# Patient Record
Sex: MALE | Race: WHITE
[De-identification: names, ages, dates, MRNs, and addresses within clinical notes are randomized per-mention and may not be internally consistent; named-entity substitution may affect disease eponyms.]

---

## 2022-03-06 ENCOUNTER — HOSPITAL ENCOUNTER (EMERGENCY)
Age: 57
Discharge: HOME | End: 2022-03-06
Payer: COMMERCIAL

## 2022-03-06 VITALS — BODY MASS INDEX: 31.1 KG/M2

## 2022-03-06 VITALS
RESPIRATION RATE: 16 BRPM | SYSTOLIC BLOOD PRESSURE: 160 MMHG | TEMPERATURE: 97.9 F | DIASTOLIC BLOOD PRESSURE: 92 MMHG | HEART RATE: 54 BPM | OXYGEN SATURATION: 99 %

## 2022-03-06 DIAGNOSIS — Z79.899: ICD-10-CM

## 2022-03-06 DIAGNOSIS — E03.9: ICD-10-CM

## 2022-03-06 DIAGNOSIS — Z79.890: ICD-10-CM

## 2022-03-06 DIAGNOSIS — R20.2: Primary | ICD-10-CM

## 2022-03-06 DIAGNOSIS — Z87.891: ICD-10-CM

## 2022-03-06 DIAGNOSIS — I10: ICD-10-CM

## 2022-03-06 DIAGNOSIS — F41.9: ICD-10-CM

## 2022-03-06 LAB
ANION GAP: 2 (ref 5–15)
BUN SERPL-MCNC: 19 MG/DL (ref 7–18)
BUN/CREAT RATIO: 18.4 RATIO (ref 10–20)
CALCIUM SERPL-MCNC: 8.9 MG/DL (ref 8.5–10.1)
CARBON DIOXIDE: 27 MMOL/L (ref 21–32)
CHLORIDE: 110 MMOL/L (ref 98–107)
DEPRECATED RDW RBC: 39.5 FL (ref 35.1–43.9)
ERYTHROCYTE [DISTWIDTH] IN BLOOD: 12.2 % (ref 11.6–14.6)
EST GLOM FILT RATE - AFR AMER: 95 ML/MIN (ref 60–?)
ESTIMATED CREATININE CLEARANCE: 85.8 ML/MIN
GLUCOSE: 121 MG/DL (ref 74–106)
HCT VFR BLD AUTO: 47.3 % (ref 40–54)
HEMOGLOBIN: 16.5 G/DL (ref 13–16.5)
HGB BLD-MCNC: 16.5 G/DL (ref 13–16.5)
IMMATURE GRANULOCYTES COUNT: 0.02 X10^3/UL (ref 0–0)
MAGNESIUM: 1.9 MG/DL (ref 1.6–2.6)
MCV RBC: 88.2 FL (ref 80–94)
MEAN CORP HGB CONC: 34.9 G/DL (ref 32–36)
MEAN PLATELET VOL.: 11.7 FL (ref 6.2–12)
NRBC FLAGGED BY ANALYZER: 0 % (ref 0–5)
PLATELET # BLD: 153 K/MM3 (ref 150–450)
PLATELET COUNT: 153 K/MM3 (ref 150–450)
POTASSIUM: 4.3 MMOL/L (ref 3.5–5.1)
RBC # BLD AUTO: 5.36 M/MM3 (ref 4.6–6.2)
RBC DISTRIBUTION WIDTH CV: 12.2 % (ref 11.6–14.6)
RBC DISTRIBUTION WIDTH SD: 39.5 FL (ref 35.1–43.9)
WBC # BLD AUTO: 5 K/MM3 (ref 4.4–11)
WHITE BLOOD COUNT: 5 K/MM3 (ref 4.4–11)

## 2022-03-06 PROCEDURE — 99283 EMERGENCY DEPT VISIT LOW MDM: CPT

## 2022-03-06 PROCEDURE — 80048 BASIC METABOLIC PNL TOTAL CA: CPT

## 2022-03-06 PROCEDURE — 85025 COMPLETE CBC W/AUTO DIFF WBC: CPT

## 2022-03-06 PROCEDURE — 83735 ASSAY OF MAGNESIUM: CPT

## 2022-03-06 PROCEDURE — A4216 STERILE WATER/SALINE, 10 ML: HCPCS

## 2022-08-27 ENCOUNTER — HOSPITAL ENCOUNTER (EMERGENCY)
Age: 57
Discharge: HOME | End: 2022-08-27
Payer: COMMERCIAL

## 2022-08-27 VITALS
TEMPERATURE: 97.52 F | HEART RATE: 95 BPM | RESPIRATION RATE: 18 BRPM | SYSTOLIC BLOOD PRESSURE: 141 MMHG | DIASTOLIC BLOOD PRESSURE: 98 MMHG | OXYGEN SATURATION: 99 %

## 2022-08-27 VITALS
TEMPERATURE: 97.52 F | SYSTOLIC BLOOD PRESSURE: 152 MMHG | OXYGEN SATURATION: 97 % | HEART RATE: 79 BPM | DIASTOLIC BLOOD PRESSURE: 95 MMHG | RESPIRATION RATE: 15 BRPM

## 2022-08-27 VITALS
SYSTOLIC BLOOD PRESSURE: 150 MMHG | RESPIRATION RATE: 16 BRPM | DIASTOLIC BLOOD PRESSURE: 91 MMHG | HEART RATE: 72 BPM | OXYGEN SATURATION: 99 %

## 2022-08-27 VITALS
SYSTOLIC BLOOD PRESSURE: 129 MMHG | DIASTOLIC BLOOD PRESSURE: 78 MMHG | RESPIRATION RATE: 13 BRPM | HEART RATE: 75 BPM | OXYGEN SATURATION: 97 %

## 2022-08-27 VITALS — BODY MASS INDEX: 27.5 KG/M2

## 2022-08-27 VITALS
SYSTOLIC BLOOD PRESSURE: 122 MMHG | DIASTOLIC BLOOD PRESSURE: 81 MMHG | RESPIRATION RATE: 16 BRPM | OXYGEN SATURATION: 98 %

## 2022-08-27 VITALS — DIASTOLIC BLOOD PRESSURE: 81 MMHG | SYSTOLIC BLOOD PRESSURE: 131 MMHG

## 2022-08-27 VITALS
RESPIRATION RATE: 16 BRPM | OXYGEN SATURATION: 96 % | HEART RATE: 95 BPM | DIASTOLIC BLOOD PRESSURE: 79 MMHG | SYSTOLIC BLOOD PRESSURE: 143 MMHG

## 2022-08-27 VITALS — BODY MASS INDEX: 2.7 KG/M2

## 2022-08-27 DIAGNOSIS — E06.3: ICD-10-CM

## 2022-08-27 DIAGNOSIS — Q23.1: ICD-10-CM

## 2022-08-27 DIAGNOSIS — R07.9: Primary | ICD-10-CM

## 2022-08-27 DIAGNOSIS — Z79.899: ICD-10-CM

## 2022-08-27 DIAGNOSIS — Z86.79: ICD-10-CM

## 2022-08-27 DIAGNOSIS — M54.9: ICD-10-CM

## 2022-08-27 DIAGNOSIS — Z87.891: ICD-10-CM

## 2022-08-27 DIAGNOSIS — R03.0: ICD-10-CM

## 2022-08-27 DIAGNOSIS — F41.9: ICD-10-CM

## 2022-08-27 DIAGNOSIS — E03.9: ICD-10-CM

## 2022-08-27 LAB
ALANINE AMINOTRANSFER ALT/SGPT: 29 U/L (ref 16–61)
ALBUMIN SERPL-MCNC: 4.1 G/DL (ref 3.2–5)
ALKALINE PHOSPHATASE: 75 U/L (ref 45–117)
ANION GAP: 5 (ref 5–15)
ANION GAP: 7 (ref 5–15)
AST(SGOT): 15 U/L (ref 15–37)
BUN SERPL-MCNC: 19 MG/DL (ref 7–18)
BUN SERPL-MCNC: 20 MG/DL (ref 7–18)
BUN/CREAT RATIO: 18.6 RATIO (ref 10–20)
BUN/CREAT RATIO: 18.9 RATIO (ref 10–20)
CALCIUM SERPL-MCNC: 8.7 MG/DL (ref 8.5–10.1)
CALCIUM SERPL-MCNC: 9.2 MG/DL (ref 8.5–10.1)
CARBON DIOXIDE: 23 MMOL/L (ref 21–32)
CARBON DIOXIDE: 29 MMOL/L (ref 21–32)
CARDIAC TROPONIN I PNL SERPL HS: 6 PG/ML (ref 3–78)
CHLORIDE: 108 MMOL/L (ref 98–107)
CHLORIDE: 110 MMOL/L (ref 98–107)
CPK TOTAL, CREATINE KINASE: 94 U/L (ref 39–308)
DEPRECATED RDW RBC: 38.6 FL (ref 35.1–43.9)
DEPRECATED RDW RBC: 40 FL (ref 35.1–43.9)
DIFFERENTIAL COMMENT: (no result)
DIFFERENTIAL INDICATED: (no result)
ERYTHROCYTE [DISTWIDTH] IN BLOOD: 12 % (ref 11.6–14.6)
ERYTHROCYTE [DISTWIDTH] IN BLOOD: 12.2 % (ref 11.6–14.6)
EST GLOM FILT RATE - AFR AMER: 92 ML/MIN (ref 60–?)
EST GLOM FILT RATE - AFR AMER: 96 ML/MIN (ref 60–?)
ESTIMATED CREATININE CLEARANCE: 10.01 ML/MIN
ESTIMATED CREATININE CLEARANCE: 82.36 ML/MIN
FREE T3: 2 PG/ML (ref 2.18–3.98)
GLOBULIN: 3.2 G/DL (ref 2.2–4.2)
GLUCOSE: 102 MG/DL (ref 74–106)
GLUCOSE: 187 MG/DL (ref 74–106)
HCT VFR BLD AUTO: 45.4 % (ref 40–54)
HCT VFR BLD AUTO: 46.3 % (ref 40–54)
HEMOGLOBIN: 15.4 G/DL (ref 13–16.5)
HEMOGLOBIN: 15.9 G/DL (ref 13–16.5)
HGB BLD-MCNC: 15.4 G/DL (ref 13–16.5)
HGB BLD-MCNC: 15.9 G/DL (ref 13–16.5)
IMMATURE GRANULOCYTES COUNT: 0.01 X10^3/UL (ref 0–0)
IMMATURE GRANULOCYTES COUNT: 0.04 X10^3/UL (ref 0–0)
MAGNESIUM: 2.1 MG/DL (ref 1.6–2.6)
MANUAL DIF COMMENT BLD-IMP: (no result)
MCV RBC: 88 FL (ref 80–94)
MCV RBC: 89.4 FL (ref 80–94)
MEAN CORP HGB CONC: 33.9 G/DL (ref 32–36)
MEAN CORP HGB CONC: 34.3 G/DL (ref 32–36)
MEAN PLATELET VOL.: 11.6 FL (ref 6.2–12)
MEAN PLATELET VOL.: 11.7 FL (ref 6.2–12)
NRBC FLAGGED BY ANALYZER: 0 % (ref 0–5)
NRBC FLAGGED BY ANALYZER: 0 % (ref 0–5)
PLATELET # BLD: 146 K/MM3 (ref 150–450)
PLATELET # BLD: 148 K/MM3 (ref 150–450)
PLATELET COUNT: 146 K/MM3 (ref 150–450)
PLATELET COUNT: 148 K/MM3 (ref 150–450)
POSITIVE DIFFERENTIAL: YES
POTASSIUM: 3.3 MMOL/L (ref 3.5–5.1)
POTASSIUM: 3.8 MMOL/L (ref 3.5–5.1)
PROTHROMBIN TIME (PROTIME)PT.: 13.1 SECONDS (ref 11.7–14.9)
RBC # BLD AUTO: 5.08 M/MM3 (ref 4.6–6.2)
RBC # BLD AUTO: 5.26 M/MM3 (ref 4.6–6.2)
RBC DISTRIBUTION WIDTH CV: 12 % (ref 11.6–14.6)
RBC DISTRIBUTION WIDTH CV: 12.2 % (ref 11.6–14.6)
RBC DISTRIBUTION WIDTH SD: 38.6 FL (ref 35.1–43.9)
RBC DISTRIBUTION WIDTH SD: 40 FL (ref 35.1–43.9)
SCAN SMEAR PER REVIEW CRITERIA: (no result)
TROPONIN-I HS: 4 PG/ML (ref 3–78)
TROPONIN-I HS: < 3 PG/ML (ref 3–78)
WBC # BLD AUTO: 6.6 K/MM3 (ref 4.4–11)
WBC # BLD AUTO: 8.6 K/MM3 (ref 4.4–11)
WHITE BLOOD COUNT: 6.6 K/MM3 (ref 4.4–11)
WHITE BLOOD COUNT: 8.6 K/MM3 (ref 4.4–11)

## 2022-08-27 PROCEDURE — 82550 ASSAY OF CK (CPK): CPT

## 2022-08-27 PROCEDURE — 99283 EMERGENCY DEPT VISIT LOW MDM: CPT

## 2022-08-27 PROCEDURE — 84443 ASSAY THYROID STIM HORMONE: CPT

## 2022-08-27 PROCEDURE — 84481 FREE ASSAY (FT-3): CPT

## 2022-08-27 PROCEDURE — 85610 PROTHROMBIN TIME: CPT

## 2022-08-27 PROCEDURE — 96375 TX/PRO/DX INJ NEW DRUG ADDON: CPT

## 2022-08-27 PROCEDURE — 84439 ASSAY OF FREE THYROXINE: CPT

## 2022-08-27 PROCEDURE — 80053 COMPREHEN METABOLIC PANEL: CPT

## 2022-08-27 PROCEDURE — 99282 EMERGENCY DEPT VISIT SF MDM: CPT

## 2022-08-27 PROCEDURE — 83735 ASSAY OF MAGNESIUM: CPT

## 2022-08-27 PROCEDURE — 71275 CT ANGIOGRAPHY CHEST: CPT

## 2022-08-27 PROCEDURE — 71045 X-RAY EXAM CHEST 1 VIEW: CPT

## 2022-08-27 PROCEDURE — 84100 ASSAY OF PHOSPHORUS: CPT

## 2022-08-27 PROCEDURE — 84484 ASSAY OF TROPONIN QUANT: CPT

## 2022-08-27 PROCEDURE — 85025 COMPLETE CBC W/AUTO DIFF WBC: CPT

## 2022-08-27 PROCEDURE — 93005 ELECTROCARDIOGRAM TRACING: CPT

## 2022-08-27 PROCEDURE — A4216 STERILE WATER/SALINE, 10 ML: HCPCS

## 2022-08-27 PROCEDURE — 80048 BASIC METABOLIC PNL TOTAL CA: CPT

## 2022-08-27 PROCEDURE — 96374 THER/PROPH/DIAG INJ IV PUSH: CPT

## 2023-04-20 ENCOUNTER — HOSPITAL ENCOUNTER (OUTPATIENT)
Dept: HOSPITAL 100 - NS | Age: 58
LOS: 10 days | End: 2023-04-30
Payer: COMMERCIAL

## 2023-04-20 DIAGNOSIS — M79.10: ICD-10-CM

## 2023-04-20 DIAGNOSIS — K90.41: ICD-10-CM

## 2023-04-20 DIAGNOSIS — E73.9: ICD-10-CM

## 2023-04-20 DIAGNOSIS — Z71.3: Primary | ICD-10-CM

## 2023-04-20 DIAGNOSIS — R63.4: ICD-10-CM

## 2023-04-20 DIAGNOSIS — R10.84: ICD-10-CM

## 2023-04-20 DIAGNOSIS — M62.81: ICD-10-CM

## 2023-04-20 DIAGNOSIS — R53.83: ICD-10-CM

## 2023-04-20 PROCEDURE — 97802 MEDICAL NUTRITION INDIV IN: CPT

## 2023-05-11 ENCOUNTER — HOSPITAL ENCOUNTER (OUTPATIENT)
Age: 58
Discharge: HOME | End: 2023-05-11
Payer: COMMERCIAL

## 2023-05-11 DIAGNOSIS — T78.40XA: Primary | ICD-10-CM

## 2023-05-11 PROCEDURE — 86003 ALLG SPEC IGE CRUDE XTRC EA: CPT

## 2023-05-11 PROCEDURE — 36415 COLL VENOUS BLD VENIPUNCTURE: CPT

## 2023-05-19 LAB
BARLEY, WHOLE GRAIN: <0.1 KU/L
BLACK WALNUT IGE QN: <0.1 KU/L
EGG WHITE IGE QN: <0.1 KU/L
EGG YOLK IGE QN: <0.1 KU/L
EGG, WHOLE: <0.1 KU/L
HAZELNUT/FILBERT: <0.1 KU/L
POTATO IGE QN: <0.1 KU/L
TURKEY MEAT IGE QN: <0.1 KU/L

## 2023-06-09 ENCOUNTER — HOSPITAL ENCOUNTER (OUTPATIENT)
Age: 58
Discharge: HOME | End: 2023-06-09
Payer: COMMERCIAL

## 2023-06-09 DIAGNOSIS — R63.4: ICD-10-CM

## 2023-06-09 DIAGNOSIS — K63.3: Primary | ICD-10-CM

## 2023-06-09 LAB
ALANINE AMINOTRANSFER ALT/SGPT: 35 U/L (ref 16–61)
ALBUMIN SERPL-MCNC: 3.7 G/DL (ref 3.2–5)
ALKALINE PHOSPHATASE: 88 U/L (ref 45–117)
ANION GAP: 6 (ref 5–15)
AST(SGOT): 23 U/L (ref 15–37)
BUN SERPL-MCNC: 25 MG/DL (ref 7–18)
BUN/CREAT RATIO: 26.9 RATIO (ref 10–20)
CALCIUM SERPL-MCNC: 8.3 MG/DL (ref 8.5–10.1)
CARBON DIOXIDE: 26 MMOL/L (ref 21–32)
CHLORIDE: 107 MMOL/L (ref 98–107)
CRP SERPL-MCNC: < 2.9 MG/L (ref 0–3)
DEPRECATED RDW RBC: 41.1 FL (ref 35.1–43.9)
ERYTHROCYTE [DISTWIDTH] IN BLOOD: 12.5 % (ref 11.6–14.6)
EST GLOM FILT RATE - AFR AMER: 107 ML/MIN (ref 60–?)
GLOBULIN: 2.9 G/DL (ref 2.2–4.2)
GLUCOSE: 99 MG/DL (ref 74–106)
HCT VFR BLD AUTO: 45.3 % (ref 40–54)
HEMOGLOBIN: 15.6 G/DL (ref 13–16.5)
HGB BLD-MCNC: 15.6 G/DL (ref 13–16.5)
IMMATURE GRANULOCYTES COUNT: 0.03 X10^3/UL (ref 0–0)
LDH: 168 U/L (ref 87–241)
MCV RBC: 91.1 FL (ref 80–94)
MEAN CORP HGB CONC: 34.4 G/DL (ref 32–36)
MEAN PLATELET VOL.: 11.5 FL (ref 6.2–12)
NRBC FLAGGED BY ANALYZER: 0 % (ref 0–5)
PLATELET # BLD: 147 K/MM3 (ref 150–450)
PLATELET COUNT: 147 K/MM3 (ref 150–450)
POTASSIUM: 3.8 MMOL/L (ref 3.5–5.1)
RBC # BLD AUTO: 4.97 M/MM3 (ref 4.6–6.2)
RBC DISTRIBUTION WIDTH CV: 12.5 % (ref 11.6–14.6)
RBC DISTRIBUTION WIDTH SD: 41.1 FL (ref 35.1–43.9)
WBC # BLD AUTO: 4.3 K/MM3 (ref 4.4–11)
WHITE BLOOD COUNT: 4.3 K/MM3 (ref 4.4–11)

## 2023-06-09 PROCEDURE — 82784 ASSAY IGA/IGD/IGG/IGM EACH: CPT

## 2023-06-09 PROCEDURE — 86235 NUCLEAR ANTIGEN ANTIBODY: CPT

## 2023-06-09 PROCEDURE — 85652 RBC SED RATE AUTOMATED: CPT

## 2023-06-09 PROCEDURE — 36415 COLL VENOUS BLD VENIPUNCTURE: CPT

## 2023-06-09 PROCEDURE — 80053 COMPREHEN METABOLIC PANEL: CPT

## 2023-06-09 PROCEDURE — 83615 LACTATE (LD) (LDH) ENZYME: CPT

## 2023-06-09 PROCEDURE — 86140 C-REACTIVE PROTEIN: CPT

## 2023-06-09 PROCEDURE — 85025 COMPLETE CBC W/AUTO DIFF WBC: CPT

## 2023-06-09 PROCEDURE — 82785 ASSAY OF IGE: CPT

## 2023-06-09 PROCEDURE — 84165 PROTEIN E-PHORESIS SERUM: CPT

## 2023-06-09 PROCEDURE — 86334 IMMUNOFIX E-PHORESIS SERUM: CPT

## 2023-06-09 PROCEDURE — 86225 DNA ANTIBODY NATIVE: CPT

## 2023-06-09 PROCEDURE — 86256 FLUORESCENT ANTIBODY TITER: CPT

## 2023-06-09 PROCEDURE — 83516 IMMUNOASSAY NONANTIBODY: CPT

## 2023-06-09 PROCEDURE — 86255 FLUORESCENT ANTIBODY SCREEN: CPT

## 2023-06-12 LAB
ANTI-CHROMATIN: <0.2 AI (ref 0–0.9)
ANTI-DSDNA  AB: <1 IU/ML (ref 0–9)
ANTI-JO: <0.2 AI (ref 0–0.9)
DSDNA AB SER-ACNC: <1 IU/ML (ref 0–9)
ENA JO1 AB SER-ACNC: <0.2 AI (ref 0–0.9)
IGA SER-ACNC: 166 MG/DL (ref 90–386)
IMMUNOGLOBULIN A: 166 MG/DL (ref 90–386)
SJOGREN'S ANTI-SS-A TEST: < 0.2 AI (ref 0–0.9)
SJOGREN'S ANTI-SS-B TEST: < 0.2 AI (ref 0–0.9)

## 2023-06-13 ENCOUNTER — HOSPITAL ENCOUNTER (OUTPATIENT)
Dept: HOSPITAL 100 - LAB | Age: 58
Discharge: HOME | End: 2023-06-13
Payer: COMMERCIAL

## 2023-06-13 DIAGNOSIS — K58.9: ICD-10-CM

## 2023-06-13 DIAGNOSIS — K63.4: ICD-10-CM

## 2023-06-13 DIAGNOSIS — K63.3: Primary | ICD-10-CM

## 2023-06-13 PROCEDURE — 83630 LACTOFERRIN FECAL (QUAL): CPT

## 2023-06-13 PROCEDURE — 83993 ASSAY FOR CALPROTECTIN FECAL: CPT

## 2023-06-15 LAB
ALBUMIN SERPL-SCNC: 4 G/DL (ref 2.9–4.4)
ALBUMIN: 4 G/DL (ref 2.9–4.4)
C-ANCA SER-ACNC: (no result) TITER
GAMMA GLOB SERPL ELPH-MCNC: 0.6 G/DL (ref 0.4–1.8)
GAMMA GLOBULIN: 0.6 G/DL (ref 0.4–1.8)
IGA SERPL-MCNC: 172 MG/DL (ref 90–386)
IGG SERPL-MCNC: 777 MG/DL (ref 603–1613)
IGM SERPL-MCNC: 38 MG/DL (ref 20–172)
IMMUNOGLOBULIN A: 172 MG/DL (ref 90–386)
IMMUNOGLOBULIN G: 777 MG/DL (ref 603–1613)
IMMUNOGLOBULIN M: 38 MG/DL (ref 20–172)
P-ANCA TITR SER IF: (no result) TITER
PROEL- TOTAL PROTEIN: 6.1 G/DL (ref 6–8.5)
PROT SERPL-MCNC: 6.1 G/DL (ref 6–8.5)

## 2023-06-22 LAB — CALPROTECTIN, STOOL: 255 UG/G (ref 0–120)

## 2023-10-27 ENCOUNTER — HOSPITAL ENCOUNTER (OUTPATIENT)
Age: 58
Discharge: HOME | End: 2023-10-27
Payer: COMMERCIAL

## 2023-10-27 DIAGNOSIS — K90.41: Primary | ICD-10-CM

## 2023-10-27 DIAGNOSIS — K63.3: ICD-10-CM

## 2023-10-27 DIAGNOSIS — R63.4: ICD-10-CM

## 2023-10-27 LAB
AMYLASE SERPL-CCNC: 70 U/L (ref 25–115)
CRP SERPL-MCNC: < 2.9 MG/L (ref 0–3)
LIPASE: 68 U/L (ref 13–75)

## 2023-10-27 PROCEDURE — 82705 FATS/LIPIDS FECES QUAL: CPT

## 2023-10-27 PROCEDURE — 82784 ASSAY IGA/IGD/IGG/IGM EACH: CPT

## 2023-10-27 PROCEDURE — 86235 NUCLEAR ANTIGEN ANTIBODY: CPT

## 2023-10-27 PROCEDURE — 86140 C-REACTIVE PROTEIN: CPT

## 2023-10-27 PROCEDURE — 86256 FLUORESCENT ANTIBODY TITER: CPT

## 2023-10-27 PROCEDURE — 83993 ASSAY FOR CALPROTECTIN FECAL: CPT

## 2023-10-27 PROCEDURE — 82653 EL-1 FECAL QUANTITATIVE: CPT

## 2023-10-27 PROCEDURE — 82150 ASSAY OF AMYLASE: CPT

## 2023-10-27 PROCEDURE — 85652 RBC SED RATE AUTOMATED: CPT

## 2023-10-27 PROCEDURE — 83690 ASSAY OF LIPASE: CPT

## 2023-10-27 PROCEDURE — 86255 FLUORESCENT ANTIBODY SCREEN: CPT

## 2023-10-27 PROCEDURE — 84165 PROTEIN E-PHORESIS SERUM: CPT

## 2023-10-27 PROCEDURE — 83630 LACTOFERRIN FECAL (QUAL): CPT

## 2023-10-27 PROCEDURE — 83516 IMMUNOASSAY NONANTIBODY: CPT

## 2023-10-27 PROCEDURE — 36415 COLL VENOUS BLD VENIPUNCTURE: CPT

## 2023-10-27 PROCEDURE — 86334 IMMUNOFIX E-PHORESIS SERUM: CPT

## 2023-10-27 PROCEDURE — 82785 ASSAY OF IGE: CPT

## 2023-10-27 PROCEDURE — 86225 DNA ANTIBODY NATIVE: CPT

## 2023-10-30 LAB
ANTI-CHROMATIN: <0.2 AI (ref 0–0.9)
ANTI-DSDNA  AB: <1 IU/ML (ref 0–9)
ANTI-JO: <0.2 AI (ref 0–0.9)
DSDNA AB SER-ACNC: <1 IU/ML (ref 0–9)
ENA JO1 AB SER-ACNC: <0.2 AI (ref 0–0.9)
SJOGREN'S ANTI-SS-A TEST: < 0.2 AI (ref 0–0.9)
SJOGREN'S ANTI-SS-B TEST: < 0.2 AI (ref 0–0.9)

## 2023-10-31 LAB
ALBUMIN SERPL-SCNC: 4.2 G/DL (ref 2.9–4.4)
ALBUMIN: 4.2 G/DL (ref 2.9–4.4)
C-ANCA SER-ACNC: (no result) TITER
GAMMA GLOB SERPL ELPH-MCNC: 0.7 G/DL (ref 0.4–1.8)
GAMMA GLOBULIN: 0.7 G/DL (ref 0.4–1.8)
IGA SERPL-MCNC: 198 MG/DL (ref 90–386)
IGG SERPL-MCNC: 827 MG/DL (ref 603–1613)
IGM SERPL-MCNC: 61 MG/DL (ref 20–172)
IMMUNOGLOBULIN A: 198 MG/DL (ref 90–386)
IMMUNOGLOBULIN G: 827 MG/DL (ref 603–1613)
IMMUNOGLOBULIN M: 61 MG/DL (ref 20–172)
P-ANCA TITR SER IF: (no result) TITER
PROEL- TOTAL PROTEIN: 6.6 G/DL (ref 6–8.5)
PROT SERPL-MCNC: 6.6 G/DL (ref 6–8.5)

## 2023-11-01 LAB — ELASTASE PANC STL QL: 230 (ref 200–?)

## 2023-11-09 LAB — CALPROTECTIN, STOOL: 30 UG/G (ref 0–120)

## 2024-09-11 ENCOUNTER — HOSPITAL ENCOUNTER (OUTPATIENT)
Age: 59
Discharge: HOME | End: 2024-09-11
Payer: COMMERCIAL

## 2024-09-11 DIAGNOSIS — Z95.2: Primary | ICD-10-CM

## 2024-09-11 LAB
ANION GAP: 8 (ref 5–15)
BUN SERPL-MCNC: 20 MG/DL (ref 7–18)
BUN/CREAT RATIO: 22.2 RATIO (ref 10–20)
CALCIUM SERPL-MCNC: 9.3 MG/DL (ref 8.5–10.1)
CARBON DIOXIDE: 23 MMOL/L (ref 21–32)
CHLORIDE: 107 MMOL/L (ref 98–107)
EST GLOM FILT RATE - AFR AMER: 110 ML/MIN (ref 60–?)
GLUCOSE: 115 MG/DL (ref 74–106)
MAGNESIUM: 2.5 MG/DL (ref 1.6–2.6)
POTASSIUM: 4.1 MMOL/L (ref 3.5–5.1)

## 2024-09-11 PROCEDURE — 36415 COLL VENOUS BLD VENIPUNCTURE: CPT

## 2024-09-11 PROCEDURE — 80048 BASIC METABOLIC PNL TOTAL CA: CPT

## 2024-09-11 PROCEDURE — 83735 ASSAY OF MAGNESIUM: CPT

## 2024-09-23 ENCOUNTER — HOSPITAL ENCOUNTER (OUTPATIENT)
Dept: HOSPITAL 100 - CR | Age: 59
Discharge: HOME | End: 2024-09-23
Payer: COMMERCIAL

## 2024-09-23 VITALS — DIASTOLIC BLOOD PRESSURE: 64 MMHG | SYSTOLIC BLOOD PRESSURE: 110 MMHG

## 2024-09-23 VITALS — OXYGEN SATURATION: 96 % | HEART RATE: 53 BPM | DIASTOLIC BLOOD PRESSURE: 64 MMHG | SYSTOLIC BLOOD PRESSURE: 110 MMHG

## 2024-09-23 VITALS — BODY MASS INDEX: 25.6 KG/M2

## 2024-09-23 DIAGNOSIS — I71.9: Primary | ICD-10-CM

## 2024-09-23 NOTE — CR.HP_ITS
CR - History & Physical    
General    
Arrival date:: 09/23/24    
Arrival time:: 13:05    
Date of Referral:: 09/11/24    
Date of CR Evaluation:: 09/23/24    
Referring Physician: Dr. Castro    
Primary Diagnosis: heart valve replacement    
History of Present Cardiac Event    
Onset Date     
Heart valve replacement or repair:: Yes (9/11/24 onset)    
Medications    
                                Ambulatory Orders    
    
    
    
?Medication ?Instructions ?Recorded    
     
acetaminophen 500 mg tablet 500 mg PO Q6H PRN 09/11/24    
    
(Tylenol Extra Strength)      
     
aspirin 81 mg tablet,delayed 81 mg PO QDAY 09/11/24    
    
release (Adult Aspirin Regimen)      
     
levothyroxine 75 mcg tablet 75 mcg PO QDAY 09/11/24    
     
magnesium oxide 400 mg PO BID 09/11/24    
     
metoprolol tartrate 25 mg tablet 12.5 mg PO BID 09/11/24    
    
    
    
Allergies    
Allergies    
    
gluten Allergy (Severe, Verified 09/11/24 15:07)    
   GI upset    
pork derived (porcine) Allergy (Severe, Verified 09/11/24 15:07)    
   palpitations    
tree nut (tree nuts) Allergy (Severe, Verified 09/11/24 15:07)    
   palpitations    
Iodinated Contrast Media Allergy (Intermediate, Verified 05/03/23 12:55)    
   convulsions     
lactose Adverse Reaction (Severe, Verified 09/11/24 15:07)    
   GI upset    
prednisone Adverse Reaction (Intermediate, Verified 09/11/24 15:07)    
   psychosis    
   if on for long-term     
    
    
Sleep Disorder Evaluation    
Hx of Sleep Apnea: No    
Do you snore loudly (louder than talking or can be heard through closed doors)?:  
No    
Do you often feel tired/ fatigued/ sleepy during daytime?: No    
Has anyone observed you stop breathing during sleep?: No    
History of Hypertension (for STOP score): No    
STOP Results: Negative    
    
Advanced Directives    
Advanced Directives    
Power of : Yes    
Living Will: Yes    
Advance Directives Information Provided: Yes    
Advance Directives on File: No    
DNR Order?:: No    
    
Past Medical History    
Covid-19 Screening    
Physicial Symptoms     
Other Clinical Concerns     
Exposure Risk     
Pertinent Comorbidities     
Has a serious heart condition:: Yes    
Past Medical Illness    
Past Medical History (Updated 09/11/24 @ 15:04 by Marychuy Mercado RN)    
    
Celiac disease K90.0    
Bradycardia R00.1    
Palpitations R00.2    
Gluten intolerance K90.41    
Lactose intolerance E73.9    
Weight loss, unintentional R63.4    
Acute constipation K59.00    
Muscle weakness M62.81    
Abdominal pain R10.9    
Myalgia M79.10    
Fatigue R53.83    
Aortic stenosis I35.0    
Migraine-cluster headache syndrome G44.009    
Bicuspid aortic valve Q23.1    
Aortic root dilation I77.810    
Former tobacco use Z87.891    
Aortic aneurysm I71.9    
   8/27/22 CTPA w/ 4.5 cm ascending thoracic aorta.    
Hashimoto's thyroiditis E06.3    
Hypothyroidism E03.9    
Cardiac murmur R01.1    
Anxiety F41.9    
    
    
Past Surgical History    
Past Surgical History (Updated 09/11/24 @ 15:04 by Marychuy Mercado RN)    
    
S/P AVR (08/29/24) Z95.2    
   BAV replace with 25# Inspiris    
History of medial meniscus repair of right knee Z98.890    
History of appendectomy Z90.49    
    
    
Family History Summary    
Family History (Updated 09/11/24 @ 15:09 by Marychuy Mercado RN)    
Father   Asthma    
   Heart disease    
   Hypertension    
   Thyroid disorder    
   Diabetes    
   Kidney disease    
Sister   Cancer    
        skin    
Uncle   Throat cancer    
Mother   Heart disease    
   Emphysema lung    
    
    
    
Social History    
Smoking History    
Smoking Status: Former smoker    
Years Smoking: 10    
Packs Smoked per Day: 1 (stopped 40 years ago)    
Alcohol Use    
Alcohol Usage: No    
Substance Abuse    
Hx Substance Use: No    
Occupation    
Occupation (List type of work in comments):: Employed    
Hours worked per day:: 8    
Hobbies, Recreation, Social Activities    
Hobbies: Walking    
Recreational Activities: I am able to engage in all my recreational activities    
    
Social Environment    
Status    
Marital Status:     
Current Living Arrangements    
Living Environment:: Family    
Children    
How many children do you have?: 1    
Do any of your children live nearby?: Yes    
Safety    
Do you feel safe in your surroundings?: Yes    
Assistance    
Do you need any assistance at home?: no    
    
Review of Systems    
Review of Systems    
Hints     
Review of Present Symptoms: Reports Shortness of Breath with Exertion, Operative  
Discomfort, Dizziness/Lightheadedness, Fatigue, Appetite - Normal, Appetite -   
Special Diet and Sleep - Normal; Denies Shortness of Breath at Rest, PVD,   
Angina, Wound Healing, Heart Arrhythmia/Irregularities or Sexual Changes    
Pain    
Is Patient Pain Free?: No    
Pain Location: neck and back    
Pain Level: 3/10    
    
Risk Factor Assessment    
Chief Complaint    
Chief Complaint: heart valve replacement     
    
Vital Signs    
Pulse Ox: 96    
Blood Pressure: 110/64    
Pulse    
Pulse Rate: 53    
Pulse Rhythm: Regular    
Hypertension    
Blood Pressure Sitting - Left Arm: 110/64    
Stress    
Stress: Home/Family    
Obesity    
Height: 6 ft    
Weight:: 189 lb    
Weight in Pounds: 189.0 lbs    
Body Mass Index (BMI): 25.6    
Nutritional Referral for Obesity: No    
Physical Inactivity    
Physical Inactivity: Reg Exercise 30 min/day and Physically demanding job    
Risk Stratification    
Risk Guidelines: Moderate Risk: Risk Factor for Smoking, Risk Factor for   
Dyslipidemia, Risk Factor for Diabetes, Risk Factor for Obesity, Risk Factor for  
Hypertension, Risk Factor for Sedentary Lifestyle and Risk Factor for Depression    
For Smoking    
Smoking Risk Guidelines     
For Dyslipidemia    
Dyslipidemia Risk Guidelines     
For Diabetes Mellitus    
Diabetes Risk Guidelines     
For Obesity/Overweight    
Obesity/Overweight Risk Guidelines     
For Hypertension    
Hypertension Risk Guidelines     
For Sedentary Lifestyle    
Sedentary Lifestyle Risk Guidelines     
For Depression    
Depression Risk Guidelines     
Family History    
Family History (Updated 09/11/24 @ 15:09 by Marychuy Mercado RN)    
Father   Asthma    
   Heart disease    
   Hypertension    
   Thyroid disorder    
   Diabetes    
   Kidney disease    
Sister   Cancer    
Uncle   Throat cancer    
Mother   Heart disease    
   Emphysema lung    
    
    
    
Motivation    
Motivation to Participate    
On a scale of 1 to 10, how prepared are you to commit to attending program?: 10    
What do you see as barriers to successfully being able to complete the program?:  
nothing    
What do you see as the benefits of succesfully completing the program? In other   
words, what do you hope to get out of participating in the program?: confidence    
Are there issues you are dealing with that will interfere with completing the   
program?: no    
Do you have a spouse or signficant other, family or friends who will help   
support you to complete the program?: yes

## 2024-09-23 NOTE — CR.ITP_ITS
Diagnosis    
General Information    
Admitting Diagnosis: heart valve replacement     
    
Personal Learning Style:: Audio/Visual    
Barriers to Learning: No Barriers    
Stage of change r/t lifestyle modifications:: Contemplation    
Gave educational material for:: Treating Heart Disease, How The Heart Works,   
What it means to have Heart Disease, How Coronary Artery Disease is Diagnosed,   
Heart Procedures, What Heart Medications Do, Risk Factors & Modifications,   
Living an Active Life, Nutrition, Emotions & Heart Disease, Stress Management &   
Relaxation and Sleep Disorders & Heart Disease    
Education/Goals    
Cardiac Rehabilitation Goals     
Personal Goals: Initial Assessment: Improve management of stress and emotions,   
Improve energy level, Participate in home exercise program, Get back to work, or  
to resume activities faster, Improve muscle strength and endurance and Control   
risk factors (learn risk factor modification)    
Scale for measuring improvement of personal goals     
Diagnosis & Disease Process    
Outcomes/Goals: Pt IDs own risk factors & lifestyle modifications by Session 10,  
Verbalizes symptoms of angina & response by session 3., Pt independently manages  
and Other Additional Outcomes/Goals:    
Plan/Interventions: Assist Pt to ID & engage in lifestyle modification to reduce  
CVD risk, Instruct on individual risk factors, Review symptoms of angina &   
emergency actions, Review secondary diagnosis & identify educational needs. and   
Other see comment    
30 day Reassessments:: Not Met    
Final Reassessments:: Not Met    
Safety    
Referral to Physical Therapy: No    
Referral to [facility] Case Management: No    
Fall Risk Assessed:: Yes    
Assistive Devices:: None    
    
Exercise - Initial Assessment    
Visit    
Date of Eval: 09/23/24 (initial eval )    
Mets: Pre-: >3 METS for 30 minutes by discharge, >5 METS for 30 minutes by   
discharge, >7 METS for 30 minutes by discharge and Unable to meet goal due to:   
(see comment below)    
Physician Prescribed Exercise    
Modalities: Treadmill, Schwinn Airdyne AD-7, SciFit Stepper, SciFit Pro-II   
Ergometer and SciFit Lateral Trainer    
Frequency: 3x/week for 12 weeks [36 sessions]    
Intensity: 60-80% of age predicted maximum heart rate reserve    
Duration: 30 - 45 minutes    
Current METSs:: 3    
Target Heart Rate::     
EKG Type: SB    
Outcomes & Goals    
Goals:: Verbalizes understanding of THR, RPE & goal METS by session 6, Documents  
in home exercise log/reports 30 min aerobic 5  day/wk by DC, Demonstrates   
accurate pulse taking by DC and Other additional outcome/goals: see below    
Intervention & Plan    
Exercise Program Goals: Instruct on personal THR & RPE, Instruct on MET level &   
personal MET goal, Show patient to take own pulse /validate performance until   
accurate, Instruct on home exercise and Other additional plan/int    
Physical Activity Home Exercise    
Physical Activity - Home Exercise: Safe Exercise, Warm-up, Self-monitoring,   
Cool-Down, Home Exercise > 30 min Daily and Sitting Time <3 hours/daily    
Outcomes & Goals    
Outcomes/Goals: Demonstrates correct Warm-up/exercise Cool-Down (S3) if = 2.5   
METs, Verbalizes symptoms of exercise intolerance by Session 3 (S3), Demonstrate  
safe equipment use (S3) & follows exercise prescrition (6) and Other: See below    
Intervention & Plan    
Plan/Intervention: Instruct warm-up & cool-down if exercising at > 2 METs,   
Instruct on symptoms of exercise intolerance & actions to take, Instruct &   
monitor on saf, Assess intial functional capacity & safety risk and Other See   
below    
    
Nutrition - Initial Assessment    
Program Goals    
Nutrition Program Goals     
Patient has diagnosis of Hyperlipidemia (ICD E78)?: No    
Visit    
Date of Eval: 09/23/24 (initial eval )    
Cholesterol/Lipids (Other Core Measures)    
Determine presence & major risk factors that modify LDL goal: Cigarette smoking,  
Hypertension or hypertensive medication, Low HDL cholesterol <40 mg/dL*, Family   
history of premature CHD in Male < 55 years: female <65 yearsFa and Age men > 45  
years; women >/= 55 years    
Outcomes/Goals: Pt IDs own risk factors & lifestyle modifications by Session 10,  
Verbalizes symptoms of angina & response by session 3., Pt independently manages  
and Other Additional Outcomes/Goals:    
Intervention/Plan: Advocate for lipid panel cholesterol medication if   
applicable, Instruct on personal lipid levels & lipid goals/NCEP guidelines,   
Instruct on cholesterol and Other additional plan/int    
Referral to dietitian:: No    
Diabetes (Other Core Measures)    
Diabetes Type: Not Applicable    
Weight Mgt (Other Care)    
Height: 6 ft    
Weight:: 189 lb    
BMI: 25.6    
Diagnosis Overweight/Obesity BMI> 30% ICD-10 E66: No    
Diagnosis High BMI/Morbid Obesity BMI> 35% ICD-10 Z68: No    
Outcomes/Goals: Pt sets, maintains & shows weight loss goal & trend during rehab  
and Other additional outcomes/goals    
Intervention/Plan: Instruct on ideal BMI & set weight loss goal w/patient,   
Assist pt to ID & incorporate diet changes for weight loss by S9, Refer to   
Structured Weight Loss program as appropriate, Encourage goal of using 250-  
300dcal per session for weight loss and Other additional plan/interventions    
Healthy Eating Habits    
Will attend diet classes:: Yes    
Outcomes/Goals:: Consume diet rich in vegs,fruits,whole grain/high   
fiber,fish,lean meat, Limit sat/trans fats,cholesterol & added salts & sugars   
and Other additional outcome/goals:    
Intervention/Plan:: Assess current eating habits and Other Additional   
plan/interventions    
Education    
Gave educational materials for:: Signs & symptoms of hypoglycemia, Signs &   
symptoms of hyperglycemia, Relate diabetes to coronary artery disease and   
Healthy eating    
    
Core - Initial Assessment    
Visit    
Date of Eval: 09/23/24 (initial eval )    
Medication Compliance    
Preventative Medication(s):: Aspirin and Beta blocker    
H/O mental health issues: depression, anxiety, or addiction?: Yes    
Doesn?t believe in the benefits of treatment?: No    
Believes medications are unnecessary or harmful?: No    
Has a concern about medication side effects?: No    
Expresses concern over the cost of medications?: No    
Outcomes/Goals: Verbalizes medications,desired effect & common side effects @   
DC, Pt self-reports following medication regimen, Keeps card in wallet   
w/medications listed by DC and Other additional outcome/goals:    
Interventions/plans: Instruct on medication effects & side effects, Review   
medication list w/patient every two weeks, Instruct importance of taking meds as  
ordered & assist problem solving and Other additional    
Tobacco Use    
How long ago did you quit using tobacco products?: Greater than or equal to 6   
months ago    
Hypertension    
Resting Blood Pressure:: 110/64    
American Heart Association Hypertension Guidelines     
Outcomes/Goals: Able to verbalize/achieve optimal blood pressure <130/80,   
Incorporates diet changes & exercise for blood pressure control by DC and Other   
additional outcomes/goals    
Interventions/plan: Instruct on optimal blood pressure, hypertension &   
medications, Instruct on effects of sodium, alcohol, stress, exercise   
&hypertension and Other additional plan/interventions    
Tobacco Cessation Referral    
Smoking Cessation Referral:: No    
Individual Education/Counseling:: No    
Education Schedule Given:: Yes    
    
Psychosocial - Initial Assess    
VIsit    
Date of Eval: 09/23/24 (initial eval )    
History of previous Mental disease:: Yes    
History of Emotional Disorders: Anxious    
Target Goals    
Target Goals     
Psychosocial Test    
Tool Used:: [employer] Power QOL Cardiac and PHQ-9 Questionnaire    
phq-9 Severity     
Referral to Behavioral Health    
PS - Interventions: Yes: Attend Stress Management Classes    
Outcomes/Goals: See list    
Psychosocial Outcomes/Goals:: ID's personal stressors & 2 strategies to manage   
stress by discharge and Other Additional outcome/goals:    
Intervention/Plan: See List    
Interventions/Plan:: Assess stressors,coping strategies & signs of derpression   
on admission, Instruct/assist pt to develop coping & personal stress Mgt   
strategies, Refer to Behavioral Health if appropriate, Refer to Physician if   
appropriate, Instruct patient to recognize signs & symptoms of depression,   
Instruct patient to recog and Other additional plan/intervention    
    
Patient Health Questionnaire    
PHQ-9 Screening    
Initial Assessment:     
      1. Little interest or pleasure in doing things: Not at all    
      2. Feeling down, depressed, or hopeless: Several days    
      3. Trouble falling or staying asleep, or sleeping too much: Not at all    
      4. Feeling tired or having little energy: Not at all    
      5. Poor appetite or overeating: Several days    
      6. Feeling bad about yourself -- or that you are a failure or have let   
yourself or your family down: Not at all    
      7. Trouble concentrating on things, such as reading the newspaper or   
watching television: Not at all    
      8. Moving or speaking so slowly that other people could have noticed. Or   
the opposite - being so fidgety or restless that you have been moving around a   
lot more than usual: Not at all    
      9. Thoughts that you would be better off dead, or of hurting yourself in   
some way: Not at all    
      How difficult have these problems made it for you to do your work, take   
care of things at home, or get along with other people?: Somewhat difficult    
      Total Score: 2    
    
KEN-Q SV Test    
Statements    
CAD is a disease of the arteries in the heart: False    
Examples of risk factors for heart disease: True    
Angina is chest pain or discomfort: True    
The benefits of resistance training include: True    
Eating more meat and dairy products: False    
Anti-platelet medications such as aspirin are important: I Don't Know    
The only effective way to manage stress: False    
An exercise warm-up slowly increases heart rate: I Don't Know    
Prepared, processed foods usually have high sodium: True    
Depression is common after a heart attack: I Don't Know    
The statin medications lower cholesterol: I Don't Know    
To control blood pressure, lower the amount of sodium: True    
If someone gets chest discomfort during walking: False    
Transfats are partially hydrogenated vegetable oils: True    
Sleep apnea that is not treated increases the risk: False    
To control cholesterol, one should become a vegetarian: False    
Someone knows if he/she is exercising at the right level: True    
Diabetes cannot be prevented with exercise & health eating: False    
Stress is a large risk for heart attack: True    
A diet that can help lower blood pressure is rich in: True    
Total Score    
Total Correct Responses: 16    
    
Self-Efficacy    
6-Item Scale    
Initial Assessment:     
      We would like to know how confident you are in doing certain activities.   
Please select your confidence level for:     
      Fatigue     
      Select Number: 4    
      Physical Discomfort or Pain     
      Select Number: 7    
      Emotional Distress     
      Select Number: 8    
      Other Symptoms or Health Problems     
      Select Number: 8    
      Different Tasks and Activities     
      Select Number: 8    
      Medication     
      Select Number: 9    
      Total Score:: 7    
    
Nutrition Survey    
Nutrition Survey Instructions    
Scoring Instructions     
Nutrition Survey    
Initial:     
      Have you lost >10 lbs over the past 2 months without trying?: No    
      Are you following a special diet at home for diabetes, low fat, or low   
salt?: No    
      Are you interested in meeting with a dietitian for help understanding your  
diet?: No    
      Do you eat less than 3 meals a day?: No    
      Do you eat fatty meats (hogan, sausage, ribs, etc), fried foods, desserts,  
large amounts of salad dressings, margarine, butter, or cheese most days?: No    
      Do you have food allergies? [Enter types in comment field]: Yes    
      Do you eat in restaurants more than 3 times a week?: No    
      Do you season food with salt, seasoning salt, or garlic salt?: Yes    
      Do you used canned, boxed, frozen meals, or soups, seasoning packets?: No    
      Total Score:: 2    
    
Exercise - 30-day Assessment    
Physician Prescribed Exercise    
Modalities: Treadmill, Schwinn Airdyne AD-7, SciFit Stepper, SciFit Pro-II   
Ergometer and SciFit Lateral Trainer    
    
Exercise - 60-day Assessment    
Physician Prescribed Exercise    
Modalities: Treadmill, Schwinn Airdyne AD-7, SciFit Stepper, SciFit Pro-II   
Ergometer and SciFit Lateral Trainer    
    
Exercise - 90-day Assessment    
Physician Prescribed Exercise    
Modalities: Treadmill, Schwinn Airdyne AD-7, SciFit Stepper, SciFit Pro-II   
Ergometer and SciFit Lateral Trainer    
    
Exercise - Final/Discharge    
Physician Prescribed Exercise    
Modalities: Treadmill, Schwinn Airdyne AD-7, SciFit Stepper, SciFit Pro-II   
Ergometer and SciFit Lateral Trainer    
Frequency: 3x/week for 12 weeks [36 sessions]    
Intensity: 60-80% of age predicted maximum heart rate reserve    
Current METSs:: 3    
Target Heart Rate::     
    
Nutrition - 30-Day Assessment    
Weight Mgt (Other Care)    
Height: 6 ft    
Weight:: 189 lb    
BMI: 25.6    
    
Nutrition - 60-Day Assessment    
Weight Mgt (Other Care)    
Height: 6 ft    
Weight:: 189 lb    
BMI: 25.6    
    
Core - Final Assessment    
Hypertension    
Resting Blood Pressure:: 110/64    
American Heart Association Hypertension Guidelines     
    
Core - 60-Day Assessment    
Hypertension    
Resting Blood Pressure:: 110/64    
American Heart Association Hypertension Guidelines     
    
Psychosocial - 30-Day Assess    
Target Goals    
Target Goals     
Referral to Behavioral Health    
PS - Interventions: Yes: Attend Stress Management Classes    
    
Psychosocial - 60-Day Assess    
Target Goals    
Target Goals     
Referral to Behavioral Health    
PS - Interventions: Yes: Attend Stress Management Classes    
    
Psychosocial - 90-Day Assess    
Target Goals    
Target Goals     
Referral to Behavioral Health    
PS - Interventions: Yes: Attend Stress Management Classes    
    
Psychosocial - Final Assessmen    
Target Goals    
Target Goals     
Referral to Behavioral Health    
PS - Interventions: Yes: Attend Stress Management Classes    
    
Nutrition - 90-Day Assessment    
Weight Mgt (Other Care)    
Height: 6 ft    
Weight:: 189 lb    
BMI: 25.6    
    
Nutrition - Final Assessment    
Program Goals    
Patient has diagnosis of Hyperlipidemia (ICD E78)?: No    
Weight Mgt (Other Care)    
Height: 6 ft    
Weight:: 189 lb    
BMI: 25.6

## 2024-09-30 ENCOUNTER — HOSPITAL ENCOUNTER (OUTPATIENT)
Dept: HOSPITAL 100 - CR | Age: 59
End: 2024-09-30
Payer: COMMERCIAL

## 2024-09-30 VITALS — BODY MASS INDEX: 25.6 KG/M2

## 2024-09-30 DIAGNOSIS — I71.9: Primary | ICD-10-CM

## 2024-09-30 PROCEDURE — 93798 PHYS/QHP OP CAR RHAB W/ECG: CPT

## 2024-10-23 VITALS — SYSTOLIC BLOOD PRESSURE: 100 MMHG | DIASTOLIC BLOOD PRESSURE: 64 MMHG

## 2024-10-23 NOTE — [email or web address].ITP
"Exercise - Initial Assessment
Visit
Session #:: 12
Physician Prescribed Exercise
Modalities: Treadmill, [employer]inn Airdyne AD-7 and SciFit Stepper

Nutrition - Initial Assessment
Weight Mgt (Other Care)
Height: 6 ft
Weight:: 196 lb
BMI: 26.6

Psychosocial - Initial Assess
Target Goals
Target Goals 
Referral to Behavioral Health
PS - Interventions: Yes: Attend Stress Management Classes

Patient Health Questionnaire
PHQ-9 Screening
30-Day Re-eval Assessment: 
      1. Little interest or pleasure in doing things: Not at all
      2. Feeling down, depressed, or hopeless: Several days
      3. Trouble falling or staying asleep, or sleeping too much: Not at all
      4. Feeling tired or having little energy: Several days
      5. Poor appetite or overeating: Not at all
      6. Feeling bad about yourself -- or that you are a failure or have let yourself or your family down: Not at all
      7. Trouble concentrating on things, such as reading the newspaper or watching television: Not at all
      8. Moving or speaking so slowly that other people could have noticed. Or the opposite - being so fidgety or restless that you have been moving around a lot more than usual: Not at all
      9. Thoughts that you would be better off dead, or of hurting yourself in some way: Not at all
      How difficult have these problems made it for you to do your work, take care of things at home, or get along with other people?: Somewhat difficult
      Total Score: 2

Self-Efficacy
6-Item Scale
30-Day Re-eval Assessment: 
      We would like to know how confident you are in doing certain activities. Please select your confidence level for: 
      Fatigue 
      Select Number: 4
      Physical Discomfort or Pain 
      Select Number: 7
      Emotional Distress 
      Select Number: 8
      Other Symptoms or Health Problems 
      Select Number: 8
      Different Tasks and Activities 
      Select Number: 8
      Medication 
      Select Number: 9
      Total Score:: 7

Nutrition Survey
Nutrition Survey Instructions
Scoring Instructions 

Exercise - 30-day Assessment
Visit
Date of Eval: 10/23/24
Session #:: 12
Physician Prescribed Exercise
Modalities: Treadmill, Schwinn Airdyne AD-7 and SciFit Stepper
Frequency: 3x/week for 12 weeks [36 sessions]
Intensity: 60-80% of age predicted maximum heart rate reserve
Duration: 30 - 45 minutes
Current METSs:: 4.7
Target Heart Rate:: 
Current RPE:: 12-13
Maximum Excercise HR:: 94
Resting Blood Pressure: 100/64
Maximum Exercise Blood Pressure: 122/72
EKG Type: SB to NSR with freq PVC, rare couplet, occ qaudrigeminy, occ bigeminy
Outcomes & Goals
Goals:: Verbalizes understanding of THR, RPE & goal METS by session 6, Documents in home exercise log/reports 30 min aerobic 5  day/wk by DC, Demonstrates accurate pulse taking by DC and Other additional outcome/goals: see below
Intervention & Plan
Exercise Program Goals: Instruct on personal THR & RPE, Instruct on MET level & personal MET goal, Show patient to take own pulse /validate performance until accurate, Instruct on home exercise and Other additional plan/int
30-day Reassessments
30 day Reassessments:: Progressing
Reassessment Notes & Comments:: pt has been able to increase exercise intensity.  RPE explained.  Pt demonstrates understanding 
 

Physical Activity Home Exercise
Physical Activity - Home Exercise: Safe Exercise, Warm-up, Self-monitoring, Cool-Down, Home Exercise > 30 min Daily and Sitting Time <3 hours/daily
Outcomes & Goals
Outcomes/Goals: Demonstrates correct Warm-up/exercise Cool-Down (S3) if = 2.5 METs, Verbalizes symptoms of exercise intolerance by Session 3 (S3), Demonstrate safe equipment use (S3) & follows exercise prescrition (6) and Other: See below
Intervention & Plan
Plan/Intervention: Instruct warm-up & cool-down if exercising at > 2 METs, Instruct on symptoms of exercise intolerance & actions to take, Instruct & monitor on saf, Assess intial functional cap
[id number]|2024-10-23 07:14:00|2024-10-23 07:14:00|CR.ITP_ITS|[facility]|[id number]|"Exercise - Initial Assessment

## 2024-10-30 ENCOUNTER — HOSPITAL ENCOUNTER (OUTPATIENT)
Dept: HOSPITAL 100 - CR | Age: 59
LOS: 1 days | End: 2024-10-31
Payer: COMMERCIAL

## 2024-10-30 VITALS — BODY MASS INDEX: 26.6 KG/M2 | BODY MASS INDEX: 25.6 KG/M2

## 2024-10-30 DIAGNOSIS — I71.9: Primary | ICD-10-CM

## 2024-10-30 PROCEDURE — 93798 PHYS/QHP OP CAR RHAB W/ECG: CPT

## 2024-11-01 VITALS — SYSTOLIC BLOOD PRESSURE: 100 MMHG | DIASTOLIC BLOOD PRESSURE: 64 MMHG

## 2024-11-21 VITALS — SYSTOLIC BLOOD PRESSURE: 104 MMHG | DIASTOLIC BLOOD PRESSURE: 62 MMHG

## 2024-11-21 VITALS — DIASTOLIC BLOOD PRESSURE: 60 MMHG | SYSTOLIC BLOOD PRESSURE: 100 MMHG

## 2024-11-27 ENCOUNTER — HOSPITAL ENCOUNTER (OUTPATIENT)
Dept: HOSPITAL 100 - CR | Age: 59
LOS: 3 days | End: 2024-11-30
Payer: COMMERCIAL

## 2024-11-27 VITALS — BODY MASS INDEX: 26.8 KG/M2 | BODY MASS INDEX: 26.6 KG/M2

## 2024-11-27 DIAGNOSIS — I71.9: Primary | ICD-10-CM

## 2024-11-27 DIAGNOSIS — Z95.2: ICD-10-CM

## 2024-11-27 PROCEDURE — 93798 PHYS/QHP OP CAR RHAB W/ECG: CPT

## 2024-12-01 VITALS — DIASTOLIC BLOOD PRESSURE: 62 MMHG | SYSTOLIC BLOOD PRESSURE: 104 MMHG

## 2024-12-18 ENCOUNTER — HOSPITAL ENCOUNTER (OUTPATIENT)
Dept: HOSPITAL 100 - CR | Age: 59
LOS: 13 days | End: 2024-12-31
Payer: COMMERCIAL

## 2024-12-18 VITALS — BODY MASS INDEX: 26.8 KG/M2

## 2024-12-18 DIAGNOSIS — I71.9: Primary | ICD-10-CM

## 2024-12-18 PROCEDURE — 93798 PHYS/QHP OP CAR RHAB W/ECG: CPT
